# Patient Record
Sex: FEMALE | Race: WHITE | NOT HISPANIC OR LATINO | URBAN - METROPOLITAN AREA
[De-identification: names, ages, dates, MRNs, and addresses within clinical notes are randomized per-mention and may not be internally consistent; named-entity substitution may affect disease eponyms.]

---

## 2017-04-13 ENCOUNTER — OFFICE VISIT (OUTPATIENT)
Dept: OTOLARYNGOLOGY | Facility: CLINIC | Age: 42
End: 2017-04-13
Payer: COMMERCIAL

## 2017-04-13 VITALS — HEIGHT: 65 IN | WEIGHT: 175 LBS | RESPIRATION RATE: 12 BRPM | BODY MASS INDEX: 29.16 KG/M2

## 2017-04-13 DIAGNOSIS — H69.92 DYSFUNCTION OF EUSTACHIAN TUBE, LEFT: Primary | ICD-10-CM

## 2017-04-13 PROCEDURE — 99213 OFFICE O/P EST LOW 20 MIN: CPT | Mod: 25 | Performed by: OTOLARYNGOLOGY

## 2017-04-13 PROCEDURE — 92511 NASOPHARYNGOSCOPY: CPT | Performed by: OTOLARYNGOLOGY

## 2017-04-13 RX ORDER — METHYLPREDNISOLONE 4 MG
TABLET, DOSE PACK ORAL
Qty: 21 TABLET | Refills: 0 | Status: SHIPPED | OUTPATIENT
Start: 2017-04-13 | End: 2021-10-04

## 2017-04-13 RX ORDER — FLUTICASONE PROPIONATE 50 MCG
1-2 SPRAY, SUSPENSION (ML) NASAL
Qty: 1 BOTTLE | Refills: 11 | Status: SHIPPED | OUTPATIENT
Start: 2017-04-13

## 2017-04-13 NOTE — NURSING NOTE
"Chief Complaint   Patient presents with     RECHECK     ear pain       Initial Resp 12  Ht 1.651 m (5' 5\")  Wt 79.4 kg (175 lb)  BMI 29.12 kg/m2 Estimated body mass index is 29.12 kg/(m^2) as calculated from the following:    Height as of this encounter: 1.651 m (5' 5\").    Weight as of this encounter: 79.4 kg (175 lb).  Medication Reconciliation: complete     Keon Simmons CMA      "

## 2017-04-13 NOTE — PROGRESS NOTES
History of Present Illness - Lindsey Martinez is a 41 year old female previously seen by me on 01/27/2016 for left Eustachian tube dysfunction. She reports today in clinic that she has been experiencing some pain in her left ear without preceding URI. There was pressure that was not relieved by swallowing or popping ears and compares the sensation in her left ear to being on an airplane. When she yawns she feels as if the TM is pulling in and out. She will occasionally feel a pulse in her ear as well. The pain is worst when she is laying supine with her head propped up.     She has met someone with nasopharyngeal cancer with similar symptoms to herself that has her concerned. She denies any hearing changes or dizziness/vertigo.       Past Medical History - No past medical history on file.    Current Medications - No current outpatient prescriptions on file.    Allergies -   Allergies   Allergen Reactions     Penicillins Shortness Of Breath       Social History -   Social History     Social History     Marital status:      Spouse name: N/A     Number of children: N/A     Years of education: N/A     Social History Main Topics     Smoking status: Never Smoker     Smokeless tobacco: Not on file     Alcohol use Not on file     Drug use: Not on file     Sexual activity: Not on file     Other Topics Concern     Not on file     Social History Narrative     No narrative on file       Family History - No family history on file.    Review of Systems - As per HPI and PMHx, otherwise review of system review of the head and neck negative.    This document serves as a record of the services and decisions personally performed and made by Beau Roe MD. It was created on his/her behalf by Estephania Murray, a trained medical scribe. The creation of this document is based the provider's statements to the medical scribe.    Chandler Murray 3:29 PM, April 13, 2017    Physical Exam  There were no vitals taken for  this visit.  BMI: There is no height or weight on file to calculate BMI.    General - The patient is well nourished and well developed, and appears to have good nutritional status.  Alert and oriented to person and place, answers questions and cooperates with examination appropriately.    Skin - No suspicious lesions or rashes.  Respiration - No respiratory distress.  Head and Face - Normocephalic and atraumatic, with no gross asymmetry noted of the contour of the facial features.  The facial nerve is intact, with strong symmetric movements.    Voice and Breathing - The patient was breathing comfortably without the use of accessory muscles. There was no wheezing, stridor, or stertor.  The patients voice was clear and strong, and had appropriate pitch and quality.    Ears - Bilateral pinna and EACs with normal appearing overlying skin. Tympanic membrane intact with good mobility on pneumatic otoscopy bilaterally. Bony landmarks of the ossicular chain are normal. The tympanic membranes are normal in appearance. No retraction, perforation, or masses.  No fluid or purulence was seen in the external canal or the middle ear.     Eyes - Extraocular movements intact.  Sclera were not icteric or injected, conjunctiva were pink and moist.    Mouth - Examination of the oral cavity showed pink, healthy oral mucosa. No lesions or ulcerations noted.  The tongue was mobile and midline, and the dentition were in good condition.      Throat - The walls of the oropharynx were smooth, pink, moist, symmetric, and had no lesions or ulcerations.  The tonsillar pillars and soft palate were symmetric.  The uvula was midline on elevation.    Neck - Normal midline excursion of the laryngotracheal complex during swallowing.  Full range of motion on passive movement.  Palpation of the occipital, submental, submandibular, internal jugular chain, and supraclavicular nodes did not demonstrate any abnormal lymph nodes or masses.  The carotid pulse  was palpable bilaterally.  Palpation of the thyroid was soft and smooth, with no nodules or goiter appreciated.  The trachea was mobile and midline.    Nose - External contour is symmetric, no gross deflection or scars.  Nasal mucosa is pink and moist with no abnormal mucus.  The septum was midline and non-obstructive, turbinates of normal size and position.  No polyps, masses, or purulence noted on examination. Deviated septum to the left with moderately enlarged turbinates with allergic changes.     Neuro - Nonfocal neuro exam is normal, CN 2 through 12 intact, normal gait and muscle tone.    Performed in clinic today:  To further evaluate the nasal cavity, I performed rigid nasal endoscopy.  I first sprayed the nasal cavity bilaterally with a mix of lidocaine and neosynephrine.  I then began on the left side using a 2.7mm, 30 degree rigid nasal endoscope.  The septum was deviated to the left and the nasal airway was open.  No abnormal secretions, purulence, or polyps were noted. The left middle turbinate and middle meatus were clearly visualized and normal in appearance.  Looking up, the olfactory cleft was unobstructed.  Going further back, the sphenoethmoid recess was normal in appearance, with healthy appearing mucosa on the face of the sphenoid.  The nasopharynx was unremarkable, and the eustachian tube opening on this side was unobstructed.    I then turned my attention to the right side.  Once again, the septum was deviated to the left, and the airway was open.  No abnormal secretions, purulence, polyps were noted.  The right middle turbinate and middle meatus were clearly visualized and normal in appearance.  Looking up, the olfactory cleft was unobstructed.  Going further back the right sphenoethmoid recess was normal in appearance, and eustachian tube opening was unobstructed.   Red - [id number] Mytamed      A/P - Patient to start Flonase 2 sprays twice daily, and also start using Medrol pack to help with  symptoms. She will have follow up with me in 4 weeks or sooner if symptoms worsen.     The information in this document, created by the medical scribe for me, accurately reflects the services I personally performed and the decisions made by me. I have reviewed and approved this document for accuracy prior to leaving the patient care area.  Beau Roe MD  3:28 PM, 04/13/17     Beau Roe MD

## 2017-04-13 NOTE — MR AVS SNAPSHOT
After Visit Summary   4/13/2017    Lindsey Martinez    MRN: [id number]           Patient Information     Date Of Birth          1975        Visit Information        Provider Department      4/13/2017 3:00 PM Beau Roe MD [facility] [place]        Today's Diagnoses     Dysfunction of eustachian tube, left    -  1      Care Instructions    Scheduling Information  To schedule your CT/MRI scan, please contact [facility] at [phone] OR [facility] at [phone]    To schedule your Surgery, please contact our Specialty Schedulers at [phone]      ENT Clinic Locations Clinic Hours Telephone Number     [facility]  [address]  [place] [address]   Monday:           1:00pm -- 5:00pm    Friday:              8:00am - 12:00pm   To schedule/reschedule an appointment with   Dr. Roe,   please contact our   Specialty Scheduling Department at:     [phone]       [facility]  [address] [place] [address] Tuesday:          8:00am -- 2:00pm         Urgent Care Locations Clinic Hours Telephone Numbers     [facility]  [address]  [place] [address]     Monday-Friday:     11:00am - 9:00pm    Saturday-Sunday:  9:00am - 5:00pm   [phone]     [facility]  [address] [place] [address]. [place] [address]     Monday-Friday:      5:00pm - 9:00pm     Saturday-Sunday:  9:00am - 5:00pm   [phone]                 Follow-ups after your visit        Your next 10 appointments already scheduled     May 11, 2017  3:00 PM CDT   [facility] [place] [facility] [place]    [address] [place] [address]  [place] [address] [phone]              Who to contact     If you have questions or need follow up information about today's clinic visit or your schedule please contact [facility] directly at [phone].  Normal or non-critical lab and imaging  "results will be communicated to you by MyChart, letter or phone within 4 business days after the clinic has received the results. If you do not hear from us within 7 days, please contact the clinic through directworx or phone. If you have a critical or abnormal lab result, we will notify you by phone as soon as possible.  Submit refill requests through directworx or call your pharmacy and they will forward the refill request to us. Please allow 3 business days for your refill to be completed.          Additional Information About Your Visit        directworx Information     directworx lets you send messages to your doctor, view your test results, renew your prescriptions, schedule appointments and more. To sign up, go to [email or web address] . Click on \"Log in\" on the left side of the screen, which will take you to the Welcome page. Then click on \"Sign up Now\" on the right side of the page.     You will be asked to enter the access code listed below, as well as some personal information. Please follow the directions to create your username and password.     Your access code is: [email or web address]  Expires: 2017  5:34 PM     Your access code will  in 90 days. If you need help or a new code, please call your [place] clinic or [phone].        Care EveryWhere ID     This is your Care EveryWhere ID. This could be used by other organizations to access your [place] medical records  [id number]        Your Vitals Were     Respirations Height BMI (Body Mass Index)             12 1.651 m (5' 5\") 29.12 kg/m2          Blood Pressure from Last 3 Encounters:   No data found for BP    Weight from Last 3 Encounters:   17 79.4 kg (175 lb)   16 78.5 kg (173 lb)              We Performed the Following     NASOPHARYNGOSCOPY          Today's Medication Changes          These changes are accurate as of: 17  5:34 PM.  If you have any questions, ask your nurse or doctor.               Start taking these medicines.     "    Dose/Directions    fluticasone 50 MCG/ACT spray   Commonly known as:  FLONASE   Used for:  Dysfunction of eustachian tube, left        Dose:  1-2 spray   Spray 1-2 sprays into both nostrils 2 times daily   Quantity:  1 Bottle   Refills:  11       methylPREDNISolone 4 MG tablet   Commonly known as:  MEDROL DOSEPAK   Used for:  Dysfunction of eustachian tube, left        Follow package instructions   Quantity:  21 tablet   Refills:  0            Where to get your medicines      These medications were sent to [email or web address] | [facility] [id number] - [place], [place] - [address] AT [facility] [employer]  [address], [place] [address]     Phone:  [phone]     fluticasone 50 MCG/ACT spray    methylPREDNISolone 4 MG tablet                Primary Care Provider    None Specified       No primary provider on file.        Thank you!     Thank you for choosing [facility]  for your [facility]. Our goal is always to provide you with excellent care. Hearing back from our patients is one way we can continue to improve our services. Please take a few minutes to complete the written survey that you may receive in the mail after your visit with us. Thank you!             Your Updated Medication List - Protect others around you: Learn how to safely use, store and throw away your medicines at www.disposemymeds.org.          This list is accurate as of: 4/13/17  5:34 PM.  Always use your most recent med list.                   Brand Name Dispense Instructions for use    fluticasone 50 MCG/ACT spray    FLONASE    1 Bottle    Spray 1-2 sprays into both nostrils 2 times daily       methylPREDNISolone 4 MG tablet    MEDROL DOSEPAK    21 tablet    Follow package instructions

## 2017-05-11 ENCOUNTER — OFFICE VISIT (OUTPATIENT)
Dept: OTOLARYNGOLOGY | Facility: CLINIC | Age: 42
End: 2017-05-11
Payer: COMMERCIAL

## 2017-05-11 VITALS — HEIGHT: 63 IN | BODY MASS INDEX: 31.71 KG/M2 | RESPIRATION RATE: 12 BRPM | WEIGHT: 179 LBS

## 2017-05-11 DIAGNOSIS — H69.92 DYSFUNCTION OF EUSTACHIAN TUBE, LEFT: Primary | ICD-10-CM

## 2017-05-11 PROCEDURE — 99213 OFFICE O/P EST LOW 20 MIN: CPT | Performed by: OTOLARYNGOLOGY

## 2017-05-11 NOTE — PROGRESS NOTES
"History of Present Illness - Lindsey Martinez is a 41 year old female previously seen by me on 04/13/2017 for eustachian tube dysfunction. At the time of this visit, I advised her to start taking Flonase 2 sprays twice daily and to start Medrol pack.     She could feel that her ear started popping and opening up. She has not yet started using the Medrol as she filled it yesterday, but she has been consistently using the nasal spray.     Her primary care physician ordered an MRI brain given that she was having some weakness in her left leg.     Past Medical History - No past medical history on file.    Current Medications -   Current Outpatient Prescriptions:      fluticasone (FLONASE) 50 MCG/ACT spray, Spray 1-2 sprays into both nostrils 2 times daily, Disp: 1 Bottle, Rfl: 11     methylPREDNISolone (MEDROL DOSEPAK) 4 MG tablet, Follow package instructions, Disp: 21 tablet, Rfl: 0    Allergies -   Allergies   Allergen Reactions     Penicillins Shortness Of Breath       Social History -   Social History     Social History     Marital status:      Spouse name: N/A     Number of children: N/A     Years of education: N/A     Social History Main Topics     Smoking status: Never Smoker     Smokeless tobacco: None     Alcohol use None     Drug use: None     Sexual activity: Not Asked     Other Topics Concern     None     Social History Narrative       Family History - No family history on file.    Review of Systems - As per HPI and PMHx, otherwise review of system review of the head and neck negative.    This document serves as a record of the services and decisions personally performed and made by Beau Roe MD. It was created on his/her behalf by Estephania Murray, a trained medical scribe. The creation of this document is based the provider's statements to the medical scribe.    Chandler Murray 3:39 PM, May 11, 2017    Physical Exam  Resp 12  Ht 1.6 m (5' 3\")  Wt 81.2 kg (179 lb)  BMI 31.71 " kg/m2  BMI: Body mass index is 31.71 kg/(m^2).    General - The patient is well nourished and well developed, and appears to have good nutritional status.  Alert and oriented to person and place, answers questions and cooperates with examination appropriately.    Skin - No suspicious lesions or rashes.  Respiration - No respiratory distress.  Head and Face - Normocephalic and atraumatic, with no gross asymmetry noted of the contour of the facial features.  The facial nerve is intact, with strong symmetric movements.    Voice and Breathing - The patient was breathing comfortably without the use of accessory muscles. There was no wheezing, stridor, or stertor.  The patients voice was clear and strong, and had appropriate pitch and quality.    Ears - Bilateral pinna and EACs with normal appearing overlying skin. Tympanic membrane intact with good mobility on pneumatic otoscopy bilaterally. Bony landmarks of the ossicular chain are normal. The tympanic membranes are normal in appearance. No retraction, perforation, or masses.  No fluid or purulence was seen in the external canal or the middle ear.     Eyes - Extraocular movements intact.  Sclera were not icteric or injected, conjunctiva were pink and moist.    Mouth - Examination of the oral cavity showed pink, healthy oral mucosa. No lesions or ulcerations noted.  The tongue was mobile and midline, and the dentition were in good condition.      Throat - The walls of the oropharynx were smooth, pink, moist, symmetric, and had no lesions or ulcerations.  The tonsillar pillars and soft palate were symmetric.  The uvula was midline on elevation.    Neck - Normal midline excursion of the laryngotracheal complex during swallowing.  Full range of motion on passive movement.  Palpation of the occipital, submental, submandibular, internal jugular chain, and supraclavicular nodes did not demonstrate any abnormal lymph nodes or masses.  The carotid pulse was palpable bilaterally.   Palpation of the thyroid was soft and smooth, with no nodules or goiter appreciated.  The trachea was mobile and midline.    Nose - External contour is symmetric, no gross deflection or scars.  Nasal mucosa is pink and moist with no abnormal mucus.  The septum was midline and non-obstructive, turbinates of normal size and position.  No polyps, masses, or purulence noted on examination.    Neuro - Nonfocal neuro exam is normal, CN 2 through 12 intact, normal gait and muscle tone.      A/P - Lindsey Martinez is a 41 year old female. She will continue using nasal spray twice daily and Zrytec for additional allergies. She is currently breastfeeding, and is cautious about taking prescription medications.     The information in this document, created by the medical scribe for me, accurately reflects the services I personally performed and the decisions made by me. I have reviewed and approved this document for accuracy prior to leaving the patient care area.  Beau Roe MD  3:39 PM, 05/11/17     Beau Roe MD

## 2017-05-11 NOTE — MR AVS SNAPSHOT
After Visit Summary   5/11/2017    Lindsey Martinez    MRN: [id number]           Patient Information     Date Of Birth          1975        Visit Information        Provider Department      5/11/2017 3:00 PM Beau Roe MD [facility] [place]        Today's Diagnoses     Dysfunction of eustachian tube, left    -  1      Care Instructions    Scheduling Information  To schedule your CT/MRI scan, please contact [facility] at [phone] OR [facility] at [phone]    To schedule your Surgery, please contact our Specialty Schedulers at [phone]      ENT Clinic Locations Clinic Hours Telephone Number     [place]  [address] [place] [address]   Monday:           1:00pm -- 5:00pm    Friday:              8:00am - 12:00pm   To schedule/reschedule an appointment with   Dr. Roe,   please contact our   Specialty Scheduling Department at:     [phone]       [facility]  [address]  [place] [address] Tuesday:          8:00am -- 2:00pm         Urgent Care Locations Clinic Hours Telephone Numbers     [facility]  [address]  [place] [address]     Monday-Friday:     11:00am - 9:00pm    Saturday-Sunday:  9:00am - 5:00pm   [phone]     [facility]  [address] [place] [address]     Monday-Friday:      5:00pm - 9:00pm     Saturday-Sunday:  9:00am - 5:00pm   [phone]                   Follow-ups after your visit        Who to contact     If you have questions or need follow up information about today's clinic visit or your schedule please contact [facility] directly at [phone].  Normal or non-critical lab and imaging results will be communicated to you by MyChart, letter or phone within 4 business days after the clinic has received the results. If you do not hear from us within 7 days, please contact the clinic through MyChart or phone. If you have a critical or  "abnormal lab result, we will notify you by phone as soon as possible.  Submit refill requests through [employer] or call your pharmacy and they will forward the refill request to us. Please allow 3 business days for your refill to be completed.          Additional Information About Your Visit        Publicatehart Information     SkyJam lets you send messages to your doctor, view your test results, renew your prescriptions, schedule appointments and more. To sign up, go to [email or web address] . Click on \"Log in\" on the left side of the screen, which will take you to the Welcome page. Then click on \"Sign up Now\" on the right side of the page.     You will be asked to enter the access code listed below, as well as some personal information. Please follow the directions to create your username and password.     Your access code is: [email or web address]  Expires: 2017  5:34 PM     Your access code will  in 90 days. If you need help or a new code, please call your [place] clinic or [phone].        Care EveryWhere ID     This is your Care EveryWhere ID. This could be used by other organizations to access your [place] medical records  [id number]        Your Vitals Were     Respirations Height BMI (Body Mass Index)             12 1.6 m (5' 3\") 31.71 kg/m2          Blood Pressure from Last 3 Encounters:   No data found for BP    Weight from Last 3 Encounters:   17 81.2 kg (179 lb)   17 79.4 kg (175 lb)   16 78.5 kg (173 lb)              Today, you had the following     No orders found for display       Primary Care Provider    None Specified       No primary provider on file.        Thank you!     Thank you for choosing [facility] [place]  for your care. Our goal is always to provide you with excellent care. Hearing back from our patients is one way we can continue to improve our services. Please take a few minutes to complete the written survey that you may receive in the mail after your visit " with us. Thank you!             Your Updated Medication List - Protect others around you: Learn how to safely use, store and throw away your medicines at www.disposemymeds.org.          This list is accurate as of: 5/11/17 11:59 PM.  Always use your most recent med list.                   Brand Name Dispense Instructions for use    fluticasone 50 MCG/ACT spray    FLONASE    1 Bottle    Spray 1-2 sprays into both nostrils 2 times daily       methylPREDNISolone 4 MG tablet    MEDROL DOSEPAK    21 tablet    Follow package instructions

## 2017-05-11 NOTE — PATIENT INSTRUCTIONS
Scheduling Information  To schedule your CT/MRI scan, please contact [facility] at [phone] OR [facility] at [phone]    To schedule your Surgery, please contact our Specialty Schedulers at [phone]      ENT Clinic Locations Clinic Hours Telephone Number     [facility]  [address] [place] [address]   Monday:           1:00pm -- 5:00pm    Friday:              8:00am - 12:00pm   To schedule/reschedule an appointment with   Dr. Roe,   please contact our   Specialty Scheduling Department at:     [phone]       [facility]  [address] [place] [address] Tuesday:          8:00am -- 2:00pm         Urgent Care Locations Clinic Hours Telephone Numbers     [facility]  [address] [place] [address]     Monday-Friday:     11:00am - 9:00pm    Saturday-Sunday:  9:00am - 5:00pm   [phone]     [facility]  [address] [place] [address]     Monday-Friday:      5:00pm - 9:00pm     Saturday-Sunday:  9:00am - 5:00pm   [phone]

## 2017-05-11 NOTE — NURSING NOTE
"Chief Complaint   Patient presents with     RECHECK     4 week follow up on left ear       Initial Resp 12  Ht 1.6 m (5' 3\")  Wt 81.2 kg (179 lb)  BMI 31.71 kg/m2 Estimated body mass index is 31.71 kg/(m^2) as calculated from the following:    Height as of this encounter: 1.6 m (5' 3\").    Weight as of this encounter: 81.2 kg (179 lb).  Medication Reconciliation: complete     Keon Simmons CMA      "

## 2020-03-10 ENCOUNTER — HEALTH MAINTENANCE LETTER (OUTPATIENT)
Age: 45
End: 2020-03-10

## 2020-12-27 ENCOUNTER — HEALTH MAINTENANCE LETTER (OUTPATIENT)
Age: 45
End: 2020-12-27

## 2021-04-24 ENCOUNTER — HEALTH MAINTENANCE LETTER (OUTPATIENT)
Age: 46
End: 2021-04-24

## 2021-07-08 ENCOUNTER — NURSE TRIAGE (OUTPATIENT)
Dept: NURSING | Facility: CLINIC | Age: 46
End: 2021-07-08

## 2021-07-08 DIAGNOSIS — Z23 HIGH PRIORITY FOR 2019 NOVEL CORONAVIRUS VACCINATION: Primary | ICD-10-CM

## 2021-07-08 NOTE — TELEPHONE ENCOUNTER
Request for 2nd COVID-19 vaccination due to 1st dose being received at a [facility] facility or vaccination site (i.e. clinic, pharmacy, school, vaccination pop-up clinic). Vaccination received at an [facility] clinic in [place].    RN obtained the following information from: Patient      Has patient received Monoclonal Antibody Treatment in the previous 90 days?  NO - Okay to Proceed    The name of 1st dose vaccine product received as first dose: Pfizer-BioNTech    Date 1st dose vaccination was given: 5-18-21    Lot #: [id number]    The 2nd dose of the mRNA COVID-19 vaccine product should be the same vaccine product as the 1st dose and be administered within appropriate timeframe.     Based on the information collected, the patient should receive the vaccine after June 4, 2021 date.         Patient reminded that CONSENT will be needed at the time of the vaccine.    Patient reminded to bring vaccine card or documentation to verify first dose.    Remind the patient not to get any other vaccinations between now and the appointment, unless instructed by their provider.      Copy blue text above regarding the 1st dose and paste into appropriate order:    MODERNA COVID-19 VACCINE 2ND DOSE APPT; OR    PFIZER COVID-19 VACCINE 2ND DOSE APPT    Update Expected Date in order to reflect date in copied text    Dx Code Z23 HIGH PRIORITY FOR 2019-nCoV vaccine

## 2021-07-09 ENCOUNTER — IMMUNIZATION (OUTPATIENT)
Dept: NURSING | Facility: CLINIC | Age: 46
End: 2021-07-09
Attending: OTOLARYNGOLOGY
Payer: COMMERCIAL

## 2021-07-09 DIAGNOSIS — Z23 HIGH PRIORITY FOR 2019 NOVEL CORONAVIRUS VACCINATION: ICD-10-CM

## 2021-07-09 PROCEDURE — 91300 PR COVID VAC PFIZER DIL RECON 30 MCG/0.3 ML IM: CPT

## 2021-07-09 PROCEDURE — 0002A PR COVID VAC PFIZER DIL RECON 30 MCG/0.3 ML IM: CPT

## 2021-08-08 ENCOUNTER — HEALTH MAINTENANCE LETTER (OUTPATIENT)
Age: 46
End: 2021-08-08

## 2021-10-04 ENCOUNTER — TRANSFERRED RECORDS (OUTPATIENT)
Dept: MULTI SPECIALTY CLINIC | Facility: CLINIC | Age: 46
End: 2021-10-04

## 2021-10-04 ENCOUNTER — OFFICE VISIT (OUTPATIENT)
Dept: OTOLARYNGOLOGY | Facility: CLINIC | Age: 46
End: 2021-10-04
Payer: COMMERCIAL

## 2021-10-04 ENCOUNTER — HEALTH MAINTENANCE LETTER (OUTPATIENT)
Age: 46
End: 2021-10-04

## 2021-10-04 VITALS
DIASTOLIC BLOOD PRESSURE: 78 MMHG | SYSTOLIC BLOOD PRESSURE: 130 MMHG | BODY MASS INDEX: 33.94 KG/M2 | WEIGHT: 191.56 LBS | TEMPERATURE: 97.4 F | HEIGHT: 63 IN

## 2021-10-04 DIAGNOSIS — H69.92 DYSFUNCTION OF LEFT EUSTACHIAN TUBE: Primary | ICD-10-CM

## 2021-10-04 LAB
HPV ABSTRACT: NORMAL
PAP-ABSTRACT: NORMAL

## 2021-10-04 PROCEDURE — 99203 OFFICE O/P NEW LOW 30 MIN: CPT | Performed by: OTOLARYNGOLOGY

## 2021-10-04 RX ORDER — ALBUTEROL SULFATE 90 UG/1
AEROSOL, METERED RESPIRATORY (INHALATION)
COMMUNITY
Start: 2020-12-08

## 2021-10-04 RX ORDER — HYDROXYZINE PAMOATE 25 MG/1
25 CAPSULE ORAL
COMMUNITY
Start: 2021-07-05

## 2021-10-04 RX ORDER — CHLORHEXIDINE GLUCONATE ORAL RINSE 1.2 MG/ML
SOLUTION DENTAL
COMMUNITY
Start: 2020-12-08

## 2021-10-04 RX ORDER — AZITHROMYCIN 250 MG/1
250 TABLET, FILM COATED ORAL
COMMUNITY
Start: 2021-09-29 | End: 2023-05-31

## 2021-10-04 RX ORDER — PROPRANOLOL HYDROCHLORIDE 10 MG/1
10 TABLET ORAL
COMMUNITY
Start: 2021-09-28

## 2021-10-04 RX ORDER — CIPROFLOXACIN AND DEXAMETHASONE 3; 1 MG/ML; MG/ML
SUSPENSION/ DROPS AURICULAR (OTIC)
COMMUNITY
Start: 2021-09-28

## 2021-10-04 ASSESSMENT — MIFFLIN-ST. JEOR: SCORE: 1483.05

## 2021-10-04 ASSESSMENT — PAIN SCALES - GENERAL: PAINLEVEL: NO PAIN (0)

## 2021-10-04 NOTE — LETTER
"    10/4/2021         RE: Lindsey Martinez  [address]  [place] [address]        Dear Colleague,    Thank you for referring your patient, Lindsey Martinez, to the [facility]. Please see a copy of my visit note below.    ENT Consultation    Lindsey Martinez who is a 46 year old female seen in consultation at the request of self.      History of Present Illness - Lindsey Martinez is a 46 year old female who has been seen for years.  She had some eustachian tube dysfunction symptoms 4 years ago.  Now says that she had Covid apparently 3 weeks ago.  Loss of sense of smell and taste which have since returned.  However left ear static \"itching\" also feeling \"plugged\".  She was told she had left ear infection.  She was given a Z-Jatin and Ciprodex.  She didn't use any of those until recently started having little bit more pressure in the ear.  She also started having little distortion of sounds or squeaky sounds according to the patient.  She said that Ciprodex drops seem to have helped her get rid of the squeaky sound that just gets a lot of pressure and plugging.  Some nonspecific \"discomfort\".  No sharp pain.  No tinnitus no vertigo no changes in hearing.      Body mass index is 33.93 kg/m .    Weight management plan: Patient was referred to their PCP to discuss a diet and exercise plan.    BP Readings from Last 1 Encounters:   10/04/21 130/78       BP noted to be well controlled today in office.     Lindsey IS NOT a smoker/uses chewing tobacco.      Past Medical History - History reviewed. No pertinent past medical history.    Current Medications -   Current Outpatient Medications:      albuterol (PROAIR HFA/PROVENTIL HFA/VENTOLIN HFA) 108 (90 Base) MCG/ACT inhaler, INHALE 2 PUFFS BY MOUTH EVERY 6 HOURS AS NEEDED (Patient not taking: Reported on 10/4/2021), Disp: , Rfl:      azithromycin (ZITHROMAX) 250 MG tablet, 250 mg (Patient not taking: Reported on 10/4/2021), Disp: , Rfl:      " chlorhexidine (PERIDEX) 0.12 % solution, SWISH 15ML BY MOUTH FOR 60 SECONDS AND SPIT OUT TWICE DAILY (Patient not taking: Reported on 10/4/2021), Disp: , Rfl:      ciprofloxacin-dexamethasone (CIPRODEX) 0.3-0.1 % otic suspension, SHAKE LIQUID AND INSTILL 4 DROPS TO LEFT EAR TWICE DAILY (Patient not taking: Reported on 10/4/2021), Disp: , Rfl:      fluticasone (FLONASE) 50 MCG/ACT spray, Spray 1-2 sprays into both nostrils 2 times daily (Patient not taking: Reported on 10/4/2021), Disp: 1 Bottle, Rfl: 11     hydrOXYzine (VISTARIL) 25 MG capsule, Take 25 mg by mouth (Patient not taking: Reported on 10/4/2021), Disp: , Rfl:      propranolol (INDERAL) 10 MG tablet, Take 10 mg by mouth (Patient not taking: Reported on 10/4/2021), Disp: , Rfl:     Allergies -   Allergies   Allergen Reactions     Penicillins Shortness Of Breath     Cats Itching     Dogs Itching     Dust Mites Other (See Comments)     sneezing       Social History -   Social History     Socioeconomic History     Marital status:      Spouse name: Not on file     Number of children: Not on file     Years of education: Not on file     Highest education level: Not on file   Occupational History     Not on file   Tobacco Use     Smoking status: Never Smoker   Substance and Sexual Activity     Alcohol use: Not on file     Drug use: Not on file     Sexual activity: Not on file   Other Topics Concern     Not on file   Social History Narrative     Not on file     Social Determinants of Health     Financial Resource Strain:      Difficulty of Paying Living Expenses:    Food Insecurity:      Worried About Running Out of Food in the Last Year:      Ran Out of Food in the Last Year:    Transportation Needs:      Lack of Transportation (Medical):      Lack of Transportation (Non-Medical):    Physical Activity:      Days of Exercise per Week:      Minutes of Exercise per Session:    Stress:      Feeling of Stress :    Social Connections:      Frequency of  "Communication with Friends and Family:      Frequency of Social Gatherings with Friends and Family:      Attends Buddhism Services:      Active Member of Clubs or Organizations:      Attends Club or Organization Meetings:      Marital Status:    Intimate Partner Violence:      Fear of Current or Ex-Partner:      Emotionally Abused:      Physically Abused:      Sexually Abused:        Family History -   Family History   Problem Relation Age of Onset     Cancer Father        Review of Systems - As per HPI and PMHx, otherwise review of system review of the head and neck negative. Otherwise 10+ review of system is negative    Physical Exam  /78 (BP Location: Right arm, Patient Position: Sitting, Cuff Size: Adult Regular)   Temp 97.4  F (36.3  C) (Temporal)   Ht 1.6 m (5' 3\")   Wt 86.9 kg (191 lb 9 oz)   LMP 09/13/2021 (Approximate)   BMI 33.93 kg/m    BMI: Body mass index is 33.93 kg/m .    General - The patient is well nourished and well developed, and appears to have good nutritional status.  Alert and oriented to person and place, answers questions and cooperates with examination appropriately.    SKIN - No suspicious lesions or rashes.  Respiration - No respiratory distress.  Head and Face - Normocephalic and atraumatic, with no gross asymmetry noted of the contour of the facial features.  The facial nerve is intact, with strong symmetric movements.    Voice and Breathing - The patient was breathing comfortably without the use of accessory muscles. The patients voice was clear and strong, and had appropriate pitch and quality.    Ears - Bilateral pinna and EACs with normal appearing overlying skin. Tympanic membrane intact with good mobility on pneumatic otoscopy bilaterally. Bony landmarks of the ossicular chain are normal. The tympanic membranes are normal in appearance. No retraction, perforation, or masses.  No fluid or purulence was seen in the external canal or the middle ear.     Eyes - " Extraocular movements intact.  Sclera were not icteric or injected, conjunctiva were pink and moist.    Mouth - Examination of the oral cavity showed pink, healthy oral mucosa. No lesions or ulcerations noted.  The tongue was mobile and midline, and the dentition were in good condition.      Throat - The walls of the oropharynx were smooth, pink, moist, symmetric, and had no lesions or ulcerations.  The tonsillar pillars and soft palate were symmetric.  The uvula was midline on elevation.    Neck - Normal midline excursion of the laryngotracheal complex during swallowing.  Full range of motion on passive movement.  Palpation of the occipital, submental, submandibular, internal jugular chain, and supraclavicular nodes did not demonstrate any abnormal lymph nodes or masses.  The carotid pulse was palpable bilaterally.  Palpation of the thyroid was soft and smooth, with no nodules or goiter appreciated.  The trachea was mobile and midline.    Nose - External contour is symmetric, no gross deflection or scars.  Nasal mucosa is pink and moist with no abnormal mucus.  The septum was midline and non-obstructive, turbinates of normal size and position.  No polyps, masses, or purulence noted on examination.    Neuro - Nonfocal neuro exam is normal, CN 2 through 12 intact, normal gait and muscle tone.      Performed in clinic today:  No procedures preformed in clinic today      A/P - Lindsey Martinez is a 46 year old female with what appears to more eustachian tube dysfunction residual possibly after ear infection.  At this point will monitor conservatively.  Eustachian tubes exercises discussed.  Patient will be seen as needed if her symptoms persist.      Beau Roe MD      Again, thank you for allowing me to participate in the care of your patient.        Sincerely,        Beau Roe MD, MD

## 2021-10-04 NOTE — PROGRESS NOTES
"ENT Consultation    Lindsey Martinez who is a 46 year old female seen in consultation at the request of self.      History of Present Illness - Lindsey Martinez is a 46 year old female who has been seen for years.  She had some eustachian tube dysfunction symptoms 4 years ago.  Now says that she had Covid apparently 3 weeks ago.  Loss of sense of smell and taste which have since returned.  However left ear static \"itching\" also feeling \"plugged\".  She was told she had left ear infection.  She was given a Z-Jatin and Ciprodex.  She didn't use any of those until recently started having little bit more pressure in the ear.  She also started having little distortion of sounds or squeaky sounds according to the patient.  She said that Ciprodex drops seem to have helped her get rid of the squeaky sound that just gets a lot of pressure and plugging.  Some nonspecific \"discomfort\".  No sharp pain.  No tinnitus no vertigo no changes in hearing.      Body mass index is 33.93 kg/m .    Weight management plan: Patient was referred to their PCP to discuss a diet and exercise plan.    BP Readings from Last 1 Encounters:   10/04/21 130/78       BP noted to be well controlled today in office.     Lindsey IS NOT a smoker/uses chewing tobacco.      Past Medical History - History reviewed. No pertinent past medical history.    Current Medications -   Current Outpatient Medications:      albuterol (PROAIR HFA/PROVENTIL HFA/VENTOLIN HFA) 108 (90 Base) MCG/ACT inhaler, INHALE 2 PUFFS BY MOUTH EVERY 6 HOURS AS NEEDED (Patient not taking: Reported on 10/4/2021), Disp: , Rfl:      azithromycin (ZITHROMAX) 250 MG tablet, 250 mg (Patient not taking: Reported on 10/4/2021), Disp: , Rfl:      chlorhexidine (PERIDEX) 0.12 % solution, SWISH 15ML BY MOUTH FOR 60 SECONDS AND SPIT OUT TWICE DAILY (Patient not taking: Reported on 10/4/2021), Disp: , Rfl:      ciprofloxacin-dexamethasone (CIPRODEX) 0.3-0.1 % otic suspension, SHAKE LIQUID AND INSTILL " 4 DROPS TO LEFT EAR TWICE DAILY (Patient not taking: Reported on 10/4/2021), Disp: , Rfl:      fluticasone (FLONASE) 50 MCG/ACT spray, Spray 1-2 sprays into both nostrils 2 times daily (Patient not taking: Reported on 10/4/2021), Disp: 1 Bottle, Rfl: 11     hydrOXYzine (VISTARIL) 25 MG capsule, Take 25 mg by mouth (Patient not taking: Reported on 10/4/2021), Disp: , Rfl:      propranolol (INDERAL) 10 MG tablet, Take 10 mg by mouth (Patient not taking: Reported on 10/4/2021), Disp: , Rfl:     Allergies -   Allergies   Allergen Reactions     Penicillins Shortness Of Breath     Cats Itching     Dogs Itching     Dust Mites Other (See Comments)     sneezing       Social History -   Social History     Socioeconomic History     Marital status:      Spouse name: Not on file     Number of children: Not on file     Years of education: Not on file     Highest education level: Not on file   Occupational History     Not on file   Tobacco Use     Smoking status: Never Smoker   Substance and Sexual Activity     Alcohol use: Not on file     Drug use: Not on file     Sexual activity: Not on file   Other Topics Concern     Not on file   Social History Narrative     Not on file     Social Determinants of Health     Financial Resource Strain:      Difficulty of Paying Living Expenses:    Food Insecurity:      Worried About Running Out of Food in the Last Year:      Ran Out of Food in the Last Year:    Transportation Needs:      Lack of Transportation (Medical):      Lack of Transportation (Non-Medical):    Physical Activity:      Days of Exercise per Week:      Minutes of Exercise per Session:    Stress:      Feeling of Stress :    Social Connections:      Frequency of Communication with Friends and Family:      Frequency of Social Gatherings with Friends and Family:      Attends Episcopalian Services:      Active Member of Clubs or Organizations:      Attends Club or Organization Meetings:      Marital Status:    Intimate Partner  "Violence:      Fear of Current or Ex-Partner:      Emotionally Abused:      Physically Abused:      Sexually Abused:        Family History -   Family History   Problem Relation Age of Onset     Cancer Father        Review of Systems - As per HPI and PMHx, otherwise review of system review of the head and neck negative. Otherwise 10+ review of system is negative    Physical Exam  /78 (BP Location: Right arm, Patient Position: Sitting, Cuff Size: Adult Regular)   Temp 97.4  F (36.3  C) (Temporal)   Ht 1.6 m (5' 3\")   Wt 86.9 kg (191 lb 9 oz)   LMP 09/13/2021 (Approximate)   BMI 33.93 kg/m    BMI: Body mass index is 33.93 kg/m .    General - The patient is well nourished and well developed, and appears to have good nutritional status.  Alert and oriented to person and place, answers questions and cooperates with examination appropriately.    SKIN - No suspicious lesions or rashes.  Respiration - No respiratory distress.  Head and Face - Normocephalic and atraumatic, with no gross asymmetry noted of the contour of the facial features.  The facial nerve is intact, with strong symmetric movements.    Voice and Breathing - The patient was breathing comfortably without the use of accessory muscles. The patients voice was clear and strong, and had appropriate pitch and quality.    Ears - Bilateral pinna and EACs with normal appearing overlying skin. Tympanic membrane intact with good mobility on pneumatic otoscopy bilaterally. Bony landmarks of the ossicular chain are normal. The tympanic membranes are normal in appearance. No retraction, perforation, or masses.  No fluid or purulence was seen in the external canal or the middle ear.     Eyes - Extraocular movements intact.  Sclera were not icteric or injected, conjunctiva were pink and moist.    Mouth - Examination of the oral cavity showed pink, healthy oral mucosa. No lesions or ulcerations noted.  The tongue was mobile and midline, and the dentition were in " good condition.      Throat - The walls of the oropharynx were smooth, pink, moist, symmetric, and had no lesions or ulcerations.  The tonsillar pillars and soft palate were symmetric.  The uvula was midline on elevation.    Neck - Normal midline excursion of the laryngotracheal complex during swallowing.  Full range of motion on passive movement.  Palpation of the occipital, submental, submandibular, internal jugular chain, and supraclavicular nodes did not demonstrate any abnormal lymph nodes or masses.  The carotid pulse was palpable bilaterally.  Palpation of the thyroid was soft and smooth, with no nodules or goiter appreciated.  The trachea was mobile and midline.    Nose - External contour is symmetric, no gross deflection or scars.  Nasal mucosa is pink and moist with no abnormal mucus.  The septum was midline and non-obstructive, turbinates of normal size and position.  No polyps, masses, or purulence noted on examination.    Neuro - Nonfocal neuro exam is normal, CN 2 through 12 intact, normal gait and muscle tone.      Performed in clinic today:  No procedures preformed in clinic today      A/P - Lindsey Martinez is a 46 year old female with what appears to more eustachian tube dysfunction residual possibly after ear infection.  At this point will monitor conservatively.  Eustachian tubes exercises discussed.  Patient will be seen as needed if her symptoms persist.      Beau Roe MD

## 2022-05-15 ENCOUNTER — HEALTH MAINTENANCE LETTER (OUTPATIENT)
Age: 47
End: 2022-05-15

## 2022-06-13 ENCOUNTER — OFFICE VISIT (OUTPATIENT)
Dept: OBGYN | Facility: CLINIC | Age: 47
End: 2022-06-13
Payer: COMMERCIAL

## 2022-06-13 VITALS
BODY MASS INDEX: 33.37 KG/M2 | OXYGEN SATURATION: 97 % | WEIGHT: 188.4 LBS | SYSTOLIC BLOOD PRESSURE: 135 MMHG | HEART RATE: 91 BPM | DIASTOLIC BLOOD PRESSURE: 87 MMHG

## 2022-06-13 DIAGNOSIS — N64.4 MASTALGIA IN FEMALE: Primary | ICD-10-CM

## 2022-06-13 PROCEDURE — 99203 OFFICE O/P NEW LOW 30 MIN: CPT | Performed by: OBSTETRICS & GYNECOLOGY

## 2022-06-13 NOTE — PATIENT INSTRUCTIONS
If you have any questions regarding your visit, Please contact your care team.     [id number]: [phone]    To Schedule an Appointment 24/7  Call: [phone] TELEPHONE NUMBER     Gaston Aguilar MD  Medical Director    Tj Desir-RN  Dana Bolden-Surgery Scheduler  Margaret-Surgery Scheduler               Tuesday-[place]  7:30 a.m-4:30 p.m    Thursday-[place]  7:30 a.m-4:30 p.m    Typical Surgery Days: Tuesday or Friday [facility]  [address] [place] [address]  PH: [phone]     Imaging Scheduling all locations  PH: [phone]     [facility]  [address] [place] [address] [place] [address]  PH: [phone]    [facility]  [address] [place] [address]  PH: [phone] Fax      **Surgeries** Our Surgery Schedulers will contact you to schedule. If you do not receive a call within 3 business days, please call [phone].    Urgent Care locations:  [facility] Monday-Friday  10 am - 8 pm  Saturday and Sunday   9 am - 5 pm  Monday-Friday   10 am - 8 pm  Saturday and Sunday   9 am - 5 pm   [phone]   If you need a medication refill, please contact your pharmacy. Please allow 3 business days for your refill to be completed.  As always, Thank you for trusting us with your healthcare needs!    see additional instructions from your care team below

## 2022-06-13 NOTE — PROGRESS NOTES
SUBJECTIVE:  Lindsey Martinez is a 46 year old female who presents with complaint of left breast mass noticed 8 days ago.  Report of pain: discomfort/itching in the breast.  Patient denies redness or discharge.         Patients past history is significant for nothing.  Review of patient's family history indicates:   Family History   Problem Relation Age of Onset     Cancer Father             OBJECTIVE:  Lungs: clear to auscultation  Heart:  regular rate and rythm without murmur  Breast:  symmetrical  without lesions  no palpable mass  no nipple discharge  no axillary adenopathy  no supraclavicular adenopathy  no infraclavicular adenopathy                    ASSESSMENT:  Mastalgia left         PLAN:  Diagnostic mammogram   PACU/ floor

## 2022-06-14 ENCOUNTER — MYC MEDICAL ADVICE (OUTPATIENT)
Dept: OBGYN | Facility: CLINIC | Age: 47
End: 2022-06-14
Payer: COMMERCIAL

## 2022-06-14 NOTE — TELEPHONE ENCOUNTER
Pt last seen yesterday for mastalgia, advised to have a mammogram and US-scheduled for tomorrow.    Pt worried about amount of radiation she would be receiving with her recent abdominal and pelvic CT (unable to see in chart), [place], and need for mammogram and US.    Pt asking if its safe to continue as planned with US and mammogram or if she can wait to do the mammogram in September.    RN routing to provider to advise.    La Lopez RN on 6/14/2022 at 3:51 PM

## 2022-06-15 ENCOUNTER — HOSPITAL ENCOUNTER (OUTPATIENT)
Dept: MAMMOGRAPHY | Facility: CLINIC | Age: 47
Discharge: HOME | End: 2022-06-15
Attending: OBSTETRICS & GYNECOLOGY
Payer: COMMERCIAL

## 2022-06-15 DIAGNOSIS — N64.4 MASTALGIA IN FEMALE: ICD-10-CM

## 2022-06-15 PROCEDURE — 77062 BREAST TOMOSYNTHESIS BI: CPT

## 2022-09-11 ENCOUNTER — HEALTH MAINTENANCE LETTER (OUTPATIENT)
Age: 47
End: 2022-09-11

## 2022-11-21 ENCOUNTER — TELEPHONE (OUTPATIENT)
Dept: OBGYN | Facility: CLINIC | Age: 47
End: 2022-11-21

## 2022-11-21 NOTE — TELEPHONE ENCOUNTER
Patient Quality Outreach    Patient is due for the following:   Colon Cancer Screening    Next Steps:   Schedule a Colonoscopy    Type of outreach:    Sent [employer] message.    Next Steps:  Reach out within 90 days via [employer].    Max number of attempts reached: No. Will try again in 90 days if patient still on fail list.    Questions for provider review:    None     Deepali Santizo MA  Chart routed to Care Team.

## 2023-05-22 NOTE — TELEPHONE ENCOUNTER
Patient Quality Outreach    Patient is due for the following:   Colon Cancer Screening  Breast Cancer Screening - Mammogram    Next Steps:   Schedule a mammogram and colonoscopy    Type of outreach:    Sent [employer] message.    Next Steps:  Reach out within 90 days via [employer].    Max number of attempts reached: Yes. Will try again in 90 days if patient still on fail list.    Questions for provider review:    None     Deepali Santizo MA  Chart routed to Care Team.

## 2023-05-25 NOTE — PROGRESS NOTES
ENT Consultation    Lindsey Martinez who is a 47 year old female seen in consultation at the request of self .      History of Present Illness - Lindsey Martinez is a 47 year old female sinus     Patient presents with history of apparently having had left frontal sinusitis started few months ago.  She started experiencing swelling in the supraorbital area on the right side and tenderness without fever or chills or significant discharge.  Plain x-ray.  She will probably sinusitis on the right side.  Patient was initially started on ofloxacin and after a week of no improvement switch to doxycycline.  She did improve but then the problem started coming back and she received more doxycycline at that time also oral prednisone.  That has improved things tremendously.  She is to use fluticasone.  She feels that because of the small taste.  Intact.:  No nasal congestion both sides fluctuating nasal breathing better on 1 side versus the other.        BP Readings from Last 1 Encounters:   05/31/23 122/78       BP noted to be well controlled today in office.     Lindsey IS NOT a smoker/uses chewing tobacco.      Past Medical History - No past medical history on file.    Current Medications -   Current Outpatient Medications:      albuterol (PROAIR HFA/PROVENTIL HFA/VENTOLIN HFA) 108 (90 Base) MCG/ACT inhaler, INHALE 2 PUFFS BY MOUTH EVERY 6 HOURS AS NEEDED, Disp: , Rfl:      fluticasone (FLONASE) 50 MCG/ACT spray, Spray 1-2 sprays into both nostrils 2 times daily, Disp: 1 Bottle, Rfl: 11     hydrOXYzine (VISTARIL) 25 MG capsule, Take 25 mg by mouth, Disp: , Rfl:      propranolol (INDERAL) 10 MG tablet, Take 10 mg by mouth, Disp: , Rfl:      chlorhexidine (PERIDEX) 0.12 % solution, SWISH 15ML BY MOUTH FOR 60 SECONDS AND SPIT OUT TWICE DAILY (Patient not taking: No sig reported), Disp: , Rfl:      ciprofloxacin-dexamethasone (CIPRODEX) 0.3-0.1 % otic suspension, SHAKE LIQUID AND INSTILL 4 DROPS TO LEFT EAR TWICE DAILY  "(Patient not taking: No sig reported), Disp: , Rfl:     Allergies -   Allergies   Allergen Reactions     Penicillins Shortness Of Breath     Cats Itching     Dogs Itching     Dust Mites Other (See Comments)     sneezing       Social History -   Social History     Socioeconomic History     Marital status:    Tobacco Use     Smoking status: Never     Smokeless tobacco: Never   Substance and Sexual Activity     Alcohol use: Not Currently     Drug use: Not Currently     Sexual activity: Yes     Partners: Male     Birth control/protection: None       Family History -   Family History   Problem Relation Age of Onset     Cancer Father        Review of Systems - As per HPI and PMHx, otherwise review of system review of the head and neck negative. Otherwise 10+ review of system is negative    Physical Exam  /78   Temp 98.2  F (36.8  C) (Temporal)   Ht 1.581 m (5' 2.25\")   Wt 84.8 kg (187 lb)   BMI 33.93 kg/m    BMI: Body mass index is 33.93 kg/m .    General - The patient is well nourished and well developed, and appears to have good nutritional status.  Alert and oriented to person and place, answers questions and cooperates with examination appropriately.    SKIN - No suspicious lesions or rashes.  Respiration - No respiratory distress.  Head and Face - Normocephalic and atraumatic, with no gross asymmetry noted of the contour of the facial features.  The facial nerve is intact, with strong symmetric movements.    Voice and Breathing - The patient was breathing comfortably without the use of accessory muscles. The patients voice was clear and strong, and had appropriate pitch and quality.    Ears - Bilateral pinna and EACs with normal appearing overlying skin. Tympanic membrane intact with good mobility on pneumatic otoscopy bilaterally. Bony landmarks of the ossicular chain are normal. The tympanic membranes are normal in appearance. No retraction, perforation, or masses.  No fluid or purulence was seen " in the external canal or the middle ear.     Eyes - Extraocular movements intact.  Sclera were not icteric or injected, conjunctiva were pink and moist.    Mouth - Examination of the oral cavity showed pink, healthy oral mucosa. No lesions or ulcerations noted.  The tongue was mobile and midline, and the dentition were in good condition.      Throat - The walls of the oropharynx were smooth, pink, moist, symmetric, and had no lesions or ulcerations.  The tonsillar pillars and soft palate were symmetric.  The uvula was midline on elevation.    Neck - Normal midline excursion of the laryngotracheal complex during swallowing.  Full range of motion on passive movement.  Palpation of the occipital, submental, submandibular, internal jugular chain, and supraclavicular nodes did not demonstrate any abnormal lymph nodes or masses.  The carotid pulse was palpable bilaterally.  Palpation of the thyroid was soft and smooth, with no nodules or goiter appreciated.  The trachea was mobile and midline.    Nose - External contour is symmetric, no gross deflection or scars.  Nasal mucosa is pink and moist with no abnormal mucus.  The septum was slightly deviated to the left and mildly obstructive, turbinates of enlarged size and position.  No polyps, masses, or purulence noted on examination.    Neuro - Nonfocal neuro exam is normal, CN 2 through 12 intact, normal gait and muscle tone.      Performed in clinic today:  To further evaluate the nasal cavity, I performed rigid nasal endoscopy.  I first sprayed the nasal cavity bilaterally with a mix of lidocaine and neosynephrine.  I then began on the left side using a 2.7mm, 30 degree rigid nasal endoscope.  The septum was deviated and the nasal airway was open.  No abnormal secretions, purulence, or polyps were noted. The left middle turbinate and middle meatus were clearly visualized and normal in appearance.  Looking up, the olfactory cleft was unobstructed.  Going further back, the  sphenoethmoid recess was normal in appearance, with healthy appearing mucosa on the face of the sphenoid.  The nasopharynx was unremarkable, and the eustachian tube opening on this side was unobstructed.    I then turned my attention to the right side.  Once again, the septum was deviated, and the airway was open.  No abnormal secretions, purulence, polyps were noted.  The right middle turbinate and middle meatus were clearly visualized and normal in appearance.  Looking up, the olfactory cleft was unobstructed.  Going further back the right sphenoethmoid recess was normal in appearance, and eustachian tube opening was unobstructed.   Black - [id number] [facility]      A/P - Lindsey Martinez is a 47 year old female patient with chronic history of frontal sinusitis no resolved without any residual symptoms.  She was offered a CT scan starting the schedule but was wondering if she can wait on that.  In the meantime she will continue Flonase for his next few weeks and suggest using nasal saline as well.  Certainly if the symptoms start returning then CT scan would be very advisable.  Patient will follow-up as needed depending on his symptoms.      Beau Roe MD

## 2023-05-31 ENCOUNTER — OFFICE VISIT (OUTPATIENT)
Dept: OTOLARYNGOLOGY | Facility: OTHER | Age: 48
End: 2023-05-31
Payer: COMMERCIAL

## 2023-05-31 VITALS
WEIGHT: 187 LBS | HEIGHT: 62 IN | DIASTOLIC BLOOD PRESSURE: 78 MMHG | BODY MASS INDEX: 34.41 KG/M2 | SYSTOLIC BLOOD PRESSURE: 122 MMHG | TEMPERATURE: 98.2 F

## 2023-05-31 DIAGNOSIS — J01.10 ACUTE FRONTAL SINUSITIS, RECURRENCE NOT SPECIFIED: Primary | ICD-10-CM

## 2023-05-31 PROCEDURE — 99213 OFFICE O/P EST LOW 20 MIN: CPT | Mod: 25 | Performed by: OTOLARYNGOLOGY

## 2023-05-31 PROCEDURE — 31231 NASAL ENDOSCOPY DX: CPT | Performed by: OTOLARYNGOLOGY

## 2023-05-31 ASSESSMENT — PAIN SCALES - GENERAL: PAINLEVEL: NO PAIN (0)

## 2023-05-31 NOTE — LETTER
5/31/2023         RE: Lindsey Martinez  [address]  [place] [address]        Dear Colleague,    Thank you for referring your patient, Lindsey Martinez, to the [facility]. Please see a copy of my visit note below.    ENT Consultation    Lindsey Martinez who is a 47 year old female seen in consultation at the request of self .      History of Present Illness - Lindsey Martinez is a 47 year old female sinus     Patient presents with history of apparently having had left frontal sinusitis started few months ago.  She started experiencing swelling in the supraorbital area on the right side and tenderness without fever or chills or significant discharge.  Plain x-ray.  She will probably sinusitis on the right side.  Patient was initially started on ofloxacin and after a week of no improvement switch to doxycycline.  She did improve but then the problem started coming back and she received more doxycycline at that time also oral prednisone.  That has improved things tremendously.  She is to use fluticasone.  She feels that because of the small taste.  Intact.:  No nasal congestion both sides fluctuating nasal breathing better on 1 side versus the other.        BP Readings from Last 1 Encounters:   05/31/23 122/78       BP noted to be well controlled today in office.     Lindsey IS NOT a smoker/uses chewing tobacco.      Past Medical History - No past medical history on file.    Current Medications -   Current Outpatient Medications:      albuterol (PROAIR HFA/PROVENTIL HFA/VENTOLIN HFA) 108 (90 Base) MCG/ACT inhaler, INHALE 2 PUFFS BY MOUTH EVERY 6 HOURS AS NEEDED, Disp: , Rfl:      fluticasone (FLONASE) 50 MCG/ACT spray, Spray 1-2 sprays into both nostrils 2 times daily, Disp: 1 Bottle, Rfl: 11     hydrOXYzine (VISTARIL) 25 MG capsule, Take 25 mg by mouth, Disp: , Rfl:      propranolol (INDERAL) 10 MG tablet, Take 10 mg by mouth, Disp: , Rfl:      chlorhexidine (PERIDEX) 0.12 %  "solution, SWISH 15ML BY MOUTH FOR 60 SECONDS AND SPIT OUT TWICE DAILY (Patient not taking: No sig reported), Disp: , Rfl:      ciprofloxacin-dexamethasone (CIPRODEX) 0.3-0.1 % otic suspension, SHAKE LIQUID AND INSTILL 4 DROPS TO LEFT EAR TWICE DAILY (Patient not taking: No sig reported), Disp: , Rfl:     Allergies -   Allergies   Allergen Reactions     Penicillins Shortness Of Breath     Cats Itching     Dogs Itching     Dust Mites Other (See Comments)     sneezing       Social History -   Social History     Socioeconomic History     Marital status:    Tobacco Use     Smoking status: Never     Smokeless tobacco: Never   Substance and Sexual Activity     Alcohol use: Not Currently     Drug use: Not Currently     Sexual activity: Yes     Partners: Male     Birth control/protection: None       Family History -   Family History   Problem Relation Age of Onset     Cancer Father        Review of Systems - As per HPI and PMHx, otherwise review of system review of the head and neck negative. Otherwise 10+ review of system is negative    Physical Exam  /78   Temp 98.2  F (36.8  C) (Temporal)   Ht 1.581 m (5' 2.25\")   Wt 84.8 kg (187 lb)   BMI 33.93 kg/m    BMI: Body mass index is 33.93 kg/m .    General - The patient is well nourished and well developed, and appears to have good nutritional status.  Alert and oriented to person and place, answers questions and cooperates with examination appropriately.    SKIN - No suspicious lesions or rashes.  Respiration - No respiratory distress.  Head and Face - Normocephalic and atraumatic, with no gross asymmetry noted of the contour of the facial features.  The facial nerve is intact, with strong symmetric movements.    Voice and Breathing - The patient was breathing comfortably without the use of accessory muscles. The patients voice was clear and strong, and had appropriate pitch and quality.    Ears - Bilateral pinna and EACs with normal appearing overlying skin. " Tympanic membrane intact with good mobility on pneumatic otoscopy bilaterally. Bony landmarks of the ossicular chain are normal. The tympanic membranes are normal in appearance. No retraction, perforation, or masses.  No fluid or purulence was seen in the external canal or the middle ear.     Eyes - Extraocular movements intact.  Sclera were not icteric or injected, conjunctiva were pink and moist.    Mouth - Examination of the oral cavity showed pink, healthy oral mucosa. No lesions or ulcerations noted.  The tongue was mobile and midline, and the dentition were in good condition.      Throat - The walls of the oropharynx were smooth, pink, moist, symmetric, and had no lesions or ulcerations.  The tonsillar pillars and soft palate were symmetric.  The uvula was midline on elevation.    Neck - Normal midline excursion of the laryngotracheal complex during swallowing.  Full range of motion on passive movement.  Palpation of the occipital, submental, submandibular, internal jugular chain, and supraclavicular nodes did not demonstrate any abnormal lymph nodes or masses.  The carotid pulse was palpable bilaterally.  Palpation of the thyroid was soft and smooth, with no nodules or goiter appreciated.  The trachea was mobile and midline.    Nose - External contour is symmetric, no gross deflection or scars.  Nasal mucosa is pink and moist with no abnormal mucus.  The septum was slightly deviated to the left and mildly obstructive, turbinates of enlarged size and position.  No polyps, masses, or purulence noted on examination.    Neuro - Nonfocal neuro exam is normal, CN 2 through 12 intact, normal gait and muscle tone.      Performed in clinic today:  To further evaluate the nasal cavity, I performed rigid nasal endoscopy.  I first sprayed the nasal cavity bilaterally with a mix of lidocaine and neosynephrine.  I then began on the left side using a 2.7mm, 30 degree rigid nasal endoscope.  The septum was deviated and the  nasal airway was open.  No abnormal secretions, purulence, or polyps were noted. The left middle turbinate and middle meatus were clearly visualized and normal in appearance.  Looking up, the olfactory cleft was unobstructed.  Going further back, the sphenoethmoid recess was normal in appearance, with healthy appearing mucosa on the face of the sphenoid.  The nasopharynx was unremarkable, and the eustachian tube opening on this side was unobstructed.    I then turned my attention to the right side.  Once again, the septum was deviated, and the airway was open.  No abnormal secretions, purulence, polyps were noted.  The right middle turbinate and middle meatus were clearly visualized and normal in appearance.  Looking up, the olfactory cleft was unobstructed.  Going further back the right sphenoethmoid recess was normal in appearance, and eustachian tube opening was unobstructed.   Black - [id number] [facility]      A/P - Lindsey Martinez is a 47 year old female patient with chronic history of frontal sinusitis no resolved without any residual symptoms.  She was offered a CT scan starting the schedule but was wondering if she can wait on that.  In the meantime she will continue Flonase for his next few weeks and suggest using nasal saline as well.  Certainly if the symptoms start returning then CT scan would be very advisable.  Patient will follow-up as needed depending on his symptoms.      Beau Roe MD        Again, thank you for allowing me to participate in the care of your patient.        Sincerely,        Beau Roe MD, MD

## 2023-10-07 ENCOUNTER — HEALTH MAINTENANCE LETTER (OUTPATIENT)
Age: 48
End: 2023-10-07

## 2023-12-16 ENCOUNTER — HEALTH MAINTENANCE LETTER (OUTPATIENT)
Age: 48
End: 2023-12-16

## 2024-09-21 ENCOUNTER — HEALTH MAINTENANCE LETTER (OUTPATIENT)
Age: 49
End: 2024-09-21

## 2024-12-07 ENCOUNTER — OFFICE VISIT (OUTPATIENT)
Dept: URGENT CARE | Facility: URGENT CARE | Age: 49
End: 2024-12-07
Payer: COMMERCIAL

## 2024-12-07 VITALS
BODY MASS INDEX: 36.11 KG/M2 | OXYGEN SATURATION: 96 % | WEIGHT: 199 LBS | DIASTOLIC BLOOD PRESSURE: 87 MMHG | HEART RATE: 94 BPM | TEMPERATURE: 99 F | SYSTOLIC BLOOD PRESSURE: 150 MMHG | RESPIRATION RATE: 26 BRPM

## 2024-12-07 DIAGNOSIS — M79.602 LEFT ARM PAIN: Primary | ICD-10-CM

## 2024-12-07 DIAGNOSIS — L30.9 ECZEMA, UNSPECIFIED TYPE: ICD-10-CM

## 2024-12-07 PROCEDURE — 36415 COLL VENOUS BLD VENIPUNCTURE: CPT | Performed by: FAMILY MEDICINE

## 2024-12-07 PROCEDURE — 85379 FIBRIN DEGRADATION QUANT: CPT | Performed by: FAMILY MEDICINE

## 2024-12-07 PROCEDURE — 99204 OFFICE O/P NEW MOD 45 MIN: CPT | Performed by: FAMILY MEDICINE

## 2024-12-07 RX ORDER — TRIAMCINOLONE ACETONIDE 1 MG/G
CREAM TOPICAL 2 TIMES DAILY
Qty: 45 G | Refills: 0 | Status: SHIPPED | OUTPATIENT
Start: 2024-12-07

## 2024-12-07 NOTE — PROGRESS NOTES
Assessment & Plan     Left arm pain  Differentials discussed in detail including DVT.  Offered ER transfer however patient declined.  D-dimer ordered.  Instructed to go ER if symptoms worsen over the weekend otherwise follow-up with PCP next week  - D dimer, quantitative; Future    Eczema, unspecified type  Kenalog prescribed and recommended to use moisturizer regularly.  - triamcinolone (KENALOG) 0.1 % external cream; Apply topically 2 times daily.      Addendum: D-dimer test came back negative.  Patient informed.  Will recommend to schedule appointment with PCP for close follow-up.  All questions answered.    Results for orders placed or performed in visit on 12/07/24   D dimer, quantitative     Status: Normal   Result Value Ref Range    D-Dimer Quantitative <0.27 0.00 - 0.50 ug/mL FEU    Narrative    This D-dimer assay is intended for use in conjunction with a clinical pretest probability assessment model to exclude pulmonary embolism (PE) and deep venous thrombosis (DVT) in outpatients suspected of PE or DVT. The cut-off value is 0.50 ug/mL FEU.         Dariel Portillo is a 49 year old, presenting for the following health issues:  Urgent Care    HPI   Concern -   Onset: 3 days  Description: c/o swelling and heavy of left upper arm radiating down into hand for 3 days.   Intensity: moderate  Progression of Symptoms:  same  Accompanying Signs & Symptoms: none  Previous history of similar problem:   Therapies tried and outcome: None  History of eczema      Review of Systems  Constitutional, HEENT, cardiovascular, pulmonary, gi and gu systems are negative, except as otherwise noted.      Objective    BP (!) 164/99   Pulse 94   Temp 99  F (37.2  C) (Tympanic)   Resp 26   Wt 90.3 kg (199 lb)   SpO2 96%   BMI 36.11 kg/m    Body mass index is 36.11 kg/m .  Physical Exam   GENERAL: alert and no distress  EYES: Eyes grossly normal to inspection, PERRL and conjunctivae and sclerae normal  NECK: no adenopathy, no  asymmetry, masses, or scars  RESP: lungs clear to auscultation - no rales, rhonchi or wheezes  CV: regular rate and rhythm, normal S1 S2, no S3 or S4, no murmur, click or rub, no peripheral edema  MS: Left upper arm mildly swollen, no tenderness, induration, warmth noted, joints range of movement normal, radial pulses 3+, sensation to touch and pressure intact  SKIN: Eczematous skin involving left elbow as shown below, no vesicles or discharge noted  NEURO: Normal strength and tone, mentation intact and speech normal  PSYCH: mentation appears normal, affect normal/bright              Signed Electronically by: Al Petersen MD

## 2024-12-09 LAB — D DIMER PPP FEU-MCNC: <0.27 UG/ML FEU (ref 0–0.5)
